# Patient Record
Sex: MALE
[De-identification: names, ages, dates, MRNs, and addresses within clinical notes are randomized per-mention and may not be internally consistent; named-entity substitution may affect disease eponyms.]

---

## 2018-04-26 ENCOUNTER — HOSPITAL ENCOUNTER (OUTPATIENT)
Dept: HOSPITAL 5 - OR | Age: 63
Discharge: HOME | End: 2018-04-26
Attending: UROLOGY
Payer: COMMERCIAL

## 2018-04-26 VITALS — SYSTOLIC BLOOD PRESSURE: 120 MMHG | DIASTOLIC BLOOD PRESSURE: 80 MMHG

## 2018-04-26 DIAGNOSIS — I10: ICD-10-CM

## 2018-04-26 DIAGNOSIS — N13.2: Primary | ICD-10-CM

## 2018-04-26 PROCEDURE — 50590 FRAGMENTING OF KIDNEY STONE: CPT

## 2018-04-26 NOTE — POST OPERATIVE NOTE
Date of procedure: 04/26/18


Pre-op diagnosis: bilat stones   r upj stone 


Post-op diagnosis: same


Findings: 





as above 


Procedure: 





r eswl


Anesthesia: GETA


Surgeon: EUSEBIO VIVEROS


Estimated blood loss: none


Pathology: none


Condition: stable


Disposition: PACU

## 2018-04-26 NOTE — ANESTHESIA CONSULTATION
Anesthesia Consult and Med Hx


Date of service: 04/26/18





- Airway


Anesthetic Teeth Evaluation: Good


ROM Head & Neck: Adequate


Mental/Hyoid Distance: Adequate


Mallampati Class: Class I


Intubation Access Assessment: Good





- Pulmonary Exam


CTA: Yes





- Cardiac Exam


Cardiac Exam: RRR





- Pre-Operative Health Status


ASA Pre-Surgery Classification: ASA2


Proposed Anesthetic Plan: General





- Pulmonary


Hx Smoking: No


Hx Sleep Apnea: No (VENKATA PRE SCREEN HIGH RISK)





- Cardiovascular System


Hx Hypertension: Yes (X 2 YRS)


Hx Heart Attack/AMI: No





- Central Nervous System


Hx Back Pain: Yes (DUE TO STONE)





- Endocrine


Hx Cirrhosis: No





- Other Systems


Hx Cancer: No

## 2018-04-26 NOTE — OPERATIVE REPORT
PREOPERATIVE DIAGNOSIS:  Bilateral renal stones, right UPJ stone and large left

renal stone.



POSTOPERATIVE DIAGNOSES:  Bilateral renal stones, right UPJ stone and large left

renal stone.



PROCEDURE:  Right in situ lithotripsy.



SURGEON:  Connor Granger MD



ANESTHESIA:  General.



FINDINGS:  This is a gentleman with bilateral stones.  He has pain more on the

right than the left.  I spoke with Dr. Norris.  He wants the UPJ stone treated

on the right and he will have followup treatment for the large stone in the

left.



PROCEDURE:  The patient brought to the operating room and placed on the

operating table.  Following induction of anesthesia, placed over the focal point

without difficulty.  The stone was well visualized in the right UPJ.  Shocks

were begun at 1 kV, increased to maximum of 5 kV.  A renal pause was carried out

because it was hitting the lower pole of the kidney.  The patient tolerated the

procedure well.  A total of 2500 shocks were given.  He was brought to recovery

room in stable condition.  The patient did not want a stent and it looked like

there was good fragmentation.





DD: 04/26/2018 08:24

DT: 04/26/2018 09:58

JOB# [id number]  [id number]

KINGSLEY/BRIGIDO

## 2018-04-26 NOTE — DISCHARGE SUMMARY
Short Stay Discharge Plan


Activity: other (no straining )


Weight Bearing Status: Full Weight Bearing


Diet: low fat, low salt


Special Instructions: other (inc fluids )


Follow up with: 


PRIMARY CARE,MD [Primary Care Provider] - 7 Days


ANKITA MELTON MD [Staff Physician] - 7 Days


Forms:  [facility]

## 2018-05-24 ENCOUNTER — HOSPITAL ENCOUNTER (OUTPATIENT)
Dept: HOSPITAL 5 - OR | Age: 63
Discharge: HOME | End: 2018-05-24
Attending: UROLOGY
Payer: COMMERCIAL

## 2018-05-24 VITALS — DIASTOLIC BLOOD PRESSURE: 78 MMHG | SYSTOLIC BLOOD PRESSURE: 127 MMHG

## 2018-05-24 DIAGNOSIS — I10: ICD-10-CM

## 2018-05-24 DIAGNOSIS — N20.0: Primary | ICD-10-CM

## 2018-05-24 DIAGNOSIS — Z88.5: ICD-10-CM

## 2018-05-24 PROCEDURE — 50590 FRAGMENTING OF KIDNEY STONE: CPT

## 2018-05-24 NOTE — DISCHARGE SUMMARY
Short Stay Discharge Plan


Activity: other (no straining )


Weight Bearing Status: Full Weight Bearing


Diet: low fat


Special Instructions: other (inc fluids )


Follow up with: 


LEONIDES FITZPATRICK MD [Primary Care Provider] - 7 Days


ANKITA MELTON MD [Staff Physician] - 7 Days

## 2018-05-24 NOTE — POST OPERATIVE NOTE
Date of procedure: 05/24/18


Pre-op diagnosis: bilat stones 


Post-op diagnosis: same


Findings: 





l renal stones 


Procedure: 





l eswl 


Anesthesia: SHAILESH


Surgeon: EUSEBIO VIVEROS


Estimated blood loss: none


Pathology: none


Condition: stable


Disposition: PACU

## 2018-05-24 NOTE — ANESTHESIA CONSULTATION
Anesthesia Consult and Med Hx


Date of service: 05/24/18





- Airway


Anesthetic Teeth Evaluation: Good, Caps


ROM Head & Neck: Adequate


Mental/Hyoid Distance: Adequate


Mallampati Class: Class II


Intubation Access Assessment: Probably Good





- Pulmonary Exam


CTA: Yes





- Cardiac Exam


Cardiac Exam: RRR





- Pre-Operative Health Status


ASA Pre-Surgery Classification: ASA2


Proposed Anesthetic Plan: General





- Pulmonary


Hx Smoking: No


Hx Sleep Apnea: No (VENKATA PRE SCREEN HIGH RISK)





- Cardiovascular System


Hx Hypertension: Yes (X 2 YRS)


Hx Heart Attack/AMI: No





- Central Nervous System


Hx Back Pain: Yes (DUE TO STONE)





- Endocrine


Hx Cirrhosis: No





- Other Systems


Hx Cancer: No

## 2018-05-24 NOTE — OPERATIVE REPORT
PREOPERATIVE DIAGNOSIS:  Bilateral renal stone.



POSTOPERATIVE DIAGNOSES:  Bilateral renal stone.



PROCEDURE:  Left lithotripsy.



SURGEON:  Connor Granger MD



ANESTHESIA:  General.



FINDINGS:  This is a gentleman, who has had right renal stone, has had that

cleared, now presents for left lithotripsy.  All risks and implications

discussed.



DESCRIPTION OF PROCEDURE:  The patient brought to the operating room and placed

on the operating table.  Following induction of anesthesia stone well localized,

both the AP and oblique image.  I reviewed the case with Dr. Norris.  He had a

ball valve stone, which was now build up in the lower pole with a caliceal

stone.  We were focusing on that stone.  Shocks were begun at 1 kV and increased

to 6 kV.  We were only at 6 for few 100 shocks.  Renal pause was carried out and

then the power was gradually increased.  The patient tolerated the procedure

well.  No significant complication.  He needs followup.  He may need further

procedures such as ureteroscopy, percutaneous nephrolithotomy.  This was

explained, brought to recovery in stable condition.





DD: 05/24/2018 08:34

DT: 05/24/2018 09:36

JOB# [id number]  [id number]

KINGSLEY/BRIGIDO

## 2018-06-07 ENCOUNTER — HOSPITAL ENCOUNTER (OUTPATIENT)
Dept: HOSPITAL 5 - OR | Age: 63
Discharge: HOME | End: 2018-06-07
Attending: UROLOGY
Payer: COMMERCIAL

## 2018-06-07 VITALS — SYSTOLIC BLOOD PRESSURE: 142 MMHG | DIASTOLIC BLOOD PRESSURE: 90 MMHG

## 2018-06-07 DIAGNOSIS — Z79.899: ICD-10-CM

## 2018-06-07 DIAGNOSIS — N13.2: Primary | ICD-10-CM

## 2018-06-07 DIAGNOSIS — I10: ICD-10-CM

## 2018-06-07 DIAGNOSIS — Z98.890: ICD-10-CM

## 2018-06-07 DIAGNOSIS — Z88.5: ICD-10-CM

## 2018-06-07 PROCEDURE — 50590 FRAGMENTING OF KIDNEY STONE: CPT

## 2018-06-07 PROCEDURE — C1758 CATHETER, URETERAL: HCPCS

## 2018-06-07 PROCEDURE — 52356 CYSTO/URETERO W/LITHOTRIPSY: CPT

## 2018-06-07 PROCEDURE — C1769 GUIDE WIRE: HCPCS

## 2018-06-07 PROCEDURE — C1726 CATH, BAL DIL, NON-VASCULAR: HCPCS

## 2018-06-07 PROCEDURE — C2617 STENT, NON-COR, TEM W/O DEL: HCPCS

## 2018-06-07 PROCEDURE — 74018 RADEX ABDOMEN 1 VIEW: CPT

## 2018-06-07 NOTE — ANESTHESIA CONSULTATION
Anesthesia Consult and Med Hx


Date of service: 06/07/18





- Airway


Anesthetic Teeth Evaluation: Poor


ROM Head & Neck: Adequate


Mental/Hyoid Distance: Adequate


Mallampati Class: Class II


Intubation Access Assessment: Probably Good





- Pulmonary Exam


CTA: Yes





- Cardiac Exam


Cardiac Exam: RRR





- Pre-Operative Health Status


ASA Pre-Surgery Classification: ASA2


Proposed Anesthetic Plan: General





- Pulmonary


Hx Smoking: No


Hx Sleep Apnea: No (VENKATA PRE SCREEN HIGH RISK)





- Cardiovascular System


Hx Hypertension: Yes (X 2 YRS)


Hx Heart Attack/AMI: No





- Central Nervous System


Hx Back Pain: Yes (DUE TO STONE)





- Endocrine


Hx Cirrhosis: No





- Other Systems


Hx Cancer: No

## 2018-06-07 NOTE — SHORT STAY SUMMARY
Short Stay Documentation


Date of service: 06/07/18





- History


H&P: obtained from office





- Allergies and Medications


Current Medications: 


 Allergies





codeine Allergy (Verified 04/16/18 17:24)


 Vomiting





 Home Medications











 Medication  Instructions  Recorded  Confirmed  Last Taken  Type


 


HYDROcodone/APAP 5-325 [[place] 1 each PO Q6HR PRN 04/16/18 06/01/18 05/03/18 

History





5/325]     


 


Lisinopril [Zestril] 10 mg PO DAILY 04/16/18 06/01/18 05/23/18 19:30 History


 


Naproxen Sodium [Aleve] 220 mg PO PRN PRN 04/16/18 06/01/18 05/03/18 History


 


Tamsulosin [Flomax] 0.4 mg PO QDAY 04/16/18 06/01/18 05/03/18 History


 


Nitrofurantoin Monohyd/M-Cryst 100 mg PO BID 05/16/18 06/01/18 05/23/18 19:30 

History





[Macrobid 100 mg Capsule]     














- Brief post op/procedure progress note


Date of procedure: 06/07/18


Pre-op diagnosis: Right ureteral stone L4, alfred renal stone , alfred hydro


Procedure: 





rt uret eswl, rt rpg cyssto, 6x28 stent


Anesthesia: GETA


Findings: 





right L4 stone


Surgeon: ANKITA MELTON


Estimated blood loss: minimal


Condition: stable





- Hospital course


Hospital course: 





or pacu home





- Disposition


Condition at discharge: Good


Disposition: DC-01 TO HOME OR SELFCARE





Short Stay Discharge Plan


Activity: advance as tolerated


Diet: advance as tolerated


Follow up with: 


ANKITA MELTON MD [Staff Physician] - 7 Days

## 2018-06-07 NOTE — XRAY REPORT
KUB: 06/07/18 12:24:00



CLINICAL: Left kidney stones.



COMPARISON: 04/25/13



FINDINGS: Left upper quadrant calcifications overlie the left kidney 

and an increase in size, number and density as the last exam. A single 

1 cm more superior calculus and a cluster of one or more inferior 

calculi.  Probable phleboliths in the pelvis.  A right paraspinous 

calcification at L4 appears to be a jojo calcification. Normal bowel 

gas pattern.  The bones are unremarkable.



IMPRESSION: Left renal calculi increased in size and number compared to 

the last exam.

## 2018-06-17 NOTE — OPERATIVE REPORT
PREOPERATIVE DIAGNOSIS:  Right ureteral L4 stone, right hydronephrosis and

bilateral renal stones.



PROCEDURE:  Right ureteral ESWL, right RPG cystoscopy and right 6 x 20 double-J

stent.



ANESTHESIA:  General.



FINDINGS:  Right L4 stone.



SURGEON:  Robi Norris M.D.



ESTIMATED BLOOD LOSS:  Minimal.



CONDITION:  Stable, OR to PACU, to home.



CLINICAL INDICATIONS:  The patient was counseled on RCBA had antibiotics, SCDs. 

Discussed options.



DESCRIPTION OF PROCEDURE:  The patient was transferred to OR suite in supine

position, anesthesia begun.  The stone was easily visible.  A total shocks were

delivered at a maximum of 6.0 kilovolts.  At the end of the procedure, could not

easily see the stone.  At this point, the patient was placed in the dorsal

lithotomy.  Cystoscopy was set up, 22-Setswana cystoscope passed.  Pancystoscopy,

no tumors or lesions.  Of note, the patient has had significant hydro for a

while and elected to proceed with stent placement to ensure adequate drainage. 

Contrast injected, confirmed our position, but there was hydroureter.  Wire

passed up the right renal pelvis.  A 6 x 20 double-J stent was passed over the

wire under direct and fluoroscopic visualization.  When the wire and string was

removed, nice proximal J, nice distal J, bladder drained.  The patient awakened

and transferred to PACU in good and stable condition.



PLAN:  Staged for future removal of stent.





DD: 06/17/2018 22:20

DT: 06/17/2018 22:50

JOB# [id number]  [id number]

ATS/NTS

## 2019-02-07 ENCOUNTER — HOSPITAL ENCOUNTER (OUTPATIENT)
Dept: HOSPITAL 5 - OR | Age: 64
Discharge: HOME | End: 2019-02-07
Attending: UROLOGY
Payer: COMMERCIAL

## 2019-02-07 VITALS — DIASTOLIC BLOOD PRESSURE: 72 MMHG | SYSTOLIC BLOOD PRESSURE: 136 MMHG

## 2019-02-07 DIAGNOSIS — M19.90: ICD-10-CM

## 2019-02-07 DIAGNOSIS — N20.0: Primary | ICD-10-CM

## 2019-02-07 DIAGNOSIS — Z79.899: ICD-10-CM

## 2019-02-07 DIAGNOSIS — I10: ICD-10-CM

## 2019-02-07 DIAGNOSIS — Z88.5: ICD-10-CM

## 2019-02-07 PROCEDURE — 50590 FRAGMENTING OF KIDNEY STONE: CPT

## 2019-02-07 NOTE — ANESTHESIA CONSULTATION
Anesthesia Consult and Med Hx


Date of service: 02/07/19





- Airway


Anesthetic Teeth Evaluation: Good


ROM Head & Neck: Adequate


Mental/Hyoid Distance: Adequate


Mallampati Class: Class I


Intubation Access Assessment: Good





- Pulmonary Exam


CTA: Yes





- Cardiac Exam


Cardiac Exam: RRR





- Pre-Operative Health Status


ASA Pre-Surgery Classification: ASA2


Proposed Anesthetic Plan: General





- Pulmonary


Hx Smoking: No


Hx Asthma: No


Hx Respiratory Symptoms: No


Hx Sleep Apnea: No (VENKATA PRE SCREEN HIGH RISK)





- Cardiovascular System


Hx Hypertension: Yes (last dose antihypertensives 2/6/19 1900)


Hx Heart Attack/AMI: No


Hx Percutaneous Transluminal Coronary Angioplasty (PTCA): No


Hx Cardia Arrhythmia: No





- Central Nervous System


Hx Seizures: No


CVA: No


Hx Back Pain: Yes (2/2 kidney stone)





- Gastrointestinal


Hx Gastroesophageal Reflux Disease: No





- Endocrine


Hx Renal Disease: No


Hx Liver Disease: No


Hx Insulin Dependent Diabetes: No


Hx Non-Insulin Dependent Diabetes: No


Hx Thyroid Disease: No





- Other Systems


Hx Obesity: No





- Additional Comments


Anesthesia Medical History Comments: No hx anesthetic complications.

## 2019-02-07 NOTE — POST OPERATIVE NOTE
Date of procedure: 02/07/19


Pre-op diagnosis: l renal stones 


Post-op diagnosis: same


Findings: 





as above


Procedure: 





L ESWL 


Anesthesia: SHAILESH


Surgeon: EUSEBIO VIVEROS


Estimated blood loss: none


Pathology: none


Condition: stable


Disposition: PACU

## 2019-02-07 NOTE — DISCHARGE SUMMARY
Short Stay Discharge Plan


Activity: other (no straining )


Weight Bearing Status: Full Weight Bearing


Diet: low fat, low cholesterol, low salt


Special Instructions: other (inc fluids )


Follow up with: 


LEONIDES FITZPATRICK MD [Primary Care Provider] - 7 Days


ANKITA MELTON MD [Staff Physician] - 7 Days

## 2019-02-07 NOTE — OPERATIVE REPORT
PREOPERATIVE DIAGNOSIS:  Left renal stone.



POSTOPERATIVE DIAGNOSIS:  Left renal stone.



PROCEDURE:  Left ESWL.



SURGEON:  Connor Granger MD



ANESTHESIA:  General.



FINDINGS:  This is a gentleman with a large stone, left kidney.  He has an

intermittent pain, he now presents for lithotripsy.



DESCRIPTION OF PROCEDURE:  The patient was brought to the operating room and

placed on the operating table.  Following induction of anesthesia, stone was

easily localized on both the AP and oblique image.  Shocks were begun at 1 kV,

increased to a maximum of 8 kV.  Total of 2500 shocks were given.  The patient

tolerated the procedure well.  Excellent fragmentation was accomplished and the

plan, he needs close followup because this was a large stone.  This was

explained to him and his family.  He tolerated the procedure well and brought to

recovery in stable condition.





DD: 02/07/2019 12:13

DT: 02/07/2019 12:33

JOB# [id number]  [id number]

KINGSLEY/BRIGIDO

## 2019-08-07 ENCOUNTER — HOSPITAL ENCOUNTER (OUTPATIENT)
Dept: HOSPITAL 5 - OR | Age: 64
Discharge: HOME | End: 2019-08-07
Attending: UROLOGY
Payer: COMMERCIAL

## 2019-08-07 VITALS — SYSTOLIC BLOOD PRESSURE: 160 MMHG | DIASTOLIC BLOOD PRESSURE: 84 MMHG

## 2019-08-07 DIAGNOSIS — N20.2: Primary | ICD-10-CM

## 2019-08-07 DIAGNOSIS — I10: ICD-10-CM

## 2019-08-07 DIAGNOSIS — Z88.5: ICD-10-CM

## 2019-08-07 DIAGNOSIS — M19.90: ICD-10-CM

## 2019-08-07 DIAGNOSIS — Z98.890: ICD-10-CM

## 2019-08-07 DIAGNOSIS — Z79.899: ICD-10-CM

## 2019-08-07 PROCEDURE — 74420 UROGRAPHY RTRGR +-KUB: CPT

## 2019-08-07 PROCEDURE — C1758 CATHETER, URETERAL: HCPCS

## 2019-08-07 PROCEDURE — 52356 CYSTO/URETERO W/LITHOTRIPSY: CPT

## 2019-08-07 PROCEDURE — C1769 GUIDE WIRE: HCPCS

## 2019-08-07 PROCEDURE — A4217 STERILE WATER/SALINE, 500 ML: HCPCS

## 2019-08-07 PROCEDURE — C2617 STENT, NON-COR, TEM W/O DEL: HCPCS

## 2019-08-07 NOTE — SHORT STAY SUMMARY
Short Stay Documentation


Date of service: 08/07/19





- History


Past Medical History: No medical history





- Allergies and Medications


Current Medications: 


                                    Allergies





codeine Allergy (Verified 04/16/18 17:24)


   Vomiting





                                Home Medications











 Medication  Instructions  Recorded  Confirmed  Last Taken  Type


 


HYDROcodone/APAP 5-325 [[place] 1 each PO Q6HR PRN 04/16/18 08/07/19 05/03/18 

History





5/325]     


 


Lisinopril [Zestril] 10 mg PO DAILY 04/16/18 08/07/19 08/06/19 20:00 History


 


Naproxen Sodium [Aleve] 220 mg PO PRN PRN 04/16/18 08/07/19 08/05/19 09:00 

History


 


Tamsulosin [Flomax] 0.4 mg PO QDAY 04/16/18 08/07/19 08/06/19 20:00 History








Active Medications





Cefazolin Sodium (Ancef/Sterile Water 2 Gm/20 Ml)  2 gm IV PREOP NR


   Stop: 08/07/19 23:59


Fentanyl (Sublimaze)  50 mcg IV Q5MIN PRN


   PRN Reason: Pain , Severe (7-10)


Lactated Ringer's (Lactated Ringers)  1,000 mls @ 100 mls/hr IV AS DIRECT FREDY


   Last Admin: 08/07/19 15:00 Dose:  100 mls/hr


   Documented by: 


Ondansetron HCl (Zofran)  4 mg IV ONCE PRN


   PRN Reason: Nausea And Vomiting











- Brief post op/procedure progress note


Date of procedure: 08/07/19


Pre-op diagnosis: left hyydro, upj 7m; left lower poles stones


Post-op diagnosis: same


Procedure: 





lt urs, laser, sbe, stent 6x28, staged for removal, alfred rpb


Anesthesia: GETA


Findings: 





left mid joey, stone likely rolled back since pain betterl; left lp stone


Surgeon: ANKITA MELTON


Estimated blood loss: minimal


Pathology: none


Condition: stable





- Hospital course


Hospital course: 





[facility]





- Disposition


Condition at discharge: Good


Disposition: DC-01 TO HOME OR SELFCARE





Short Stay Discharge Plan


Activity: advance as tolerated


Diet: advance as tolerated


Follow up with: 


ANKITA MELTON MD [Staff Physician] - 7 Days

## 2019-08-07 NOTE — ANESTHESIA CONSULTATION
Anesthesia Consult and Med Hx


Date of service: 08/07/19





- Airway


Anesthetic Teeth Evaluation: Chipped, Caps


ROM Head & Neck: Adequate


Mental/Hyoid Distance: Adequate


Mallampati Class: Class II


Intubation Access Assessment: Good





- Pre-Operative Health Status


ASA Pre-Surgery Classification: ASA2, Emergency


Proposed Anesthetic Plan: General





- Pulmonary


Hx Smoking: No


Hx Asthma: No


Hx Respiratory Symptoms: No


Hx Sleep Apnea: No (VENKATA PRE SCREEN HIGH RISK)





- Cardiovascular System


Hx Hypertension: Yes (last dose antihypertensives yesterday PM)


Hx Heart Attack/AMI: No


Hx Percutaneous Transluminal Coronary Angioplasty (PTCA): No


Hx Cardia Arrhythmia: No





- Central Nervous System


Hx Seizures: No


CVA: No


Hx Back Pain: Yes (2/2 kidney stone)





- Gastrointestinal


Hx Gastroesophageal Reflux Disease: No





- Endocrine


Hx Renal Disease: Yes (Stones)


Hx Cirrhosis: No


Hx Liver Disease: No


Hx Insulin Dependent Diabetes: No


Hx Non-Insulin Dependent Diabetes: No


Hx Thyroid Disease: No





- Other Systems


Hx Cancer: No


Hx Obesity: No

## 2019-08-07 NOTE — FLUOROSCOPY REPORT
8 fluoroscopic images submitted



Indication:  Intraoperative localization



Impression:  8 images of abdomen were submitted for documentation purposes with radiology involvement
.  Bilateral cystoscopy performed with stones visualized in the left kidney, stone removal, and ultim
ately left-sided double-J ureteral stent placement in satisfactory position.



Fluoroscopic time:  3.3 minutes



Signer Name: Zay Foy MD 

Signed: 8/7/2019 7:05 PM

 Workstation Name: [id number]

## 2019-08-22 NOTE — OPERATIVE REPORT
PREOPERATIVE DIAGNOSES:  Left proximal ureteral stone, left renal stones.



POSTOPERATIVE DIAGNOSES:  Left proximal ureteral stone, left renal stones.



PROCEDURES:  Cystoscopy, bilateral RPG, left ureteroscopy, holmium laser

fragmentation of the UPJ stone, holmium laser fragmentation of lower pole

stones, left stent placement, staged for future removal.



SURGEON:  Robi Norris MD.



ANESTHESIA:  General.



SPECIMEN:  None.



ESTIMATED BLOOD LOSS:  Minimal.



FINDINGS:  Stone in left renal pelvis, stone in the lower pole difficult to

access and of an anatomy that may be difficult to passively move to the UPJ.



CLINICAL INDICATIONS:  Counseled RCBA, antibiotics, SCD.



DESCRIPTION OF PROCEDURE:  Transferred to OR suite in supine position,

anesthesia, dorsal lithotomy, prepped and draped in standard fashion.  A

22-Pakistani scope passed.  Pancystoscopy, no tumors.  Right retrograde pyelogram,

8-Pakistani cone tipped catheter within normal limits.  There was radiopaque

density before contrast was injected, probably left renal pelvis and left lower

pole and probably left mid jose f, could be a stone.  At this point, dye was

injected.  No filling defects.  Second Glidewire passed.  Flexible scope was

passed over the wire up to the renal pelvis.  No stone at the UPJ was just

inside the pelvis and then pushed back to the mid portion of the kidney. 

Holmium laser fiber passed.  This was fragmented into smaller and smaller pieces

with continued irrigation, evaluation and inspection, removing the scope, which

was difficult due to its location.  Next, when this was in tiny fragments, we

passed the scope into the lower pole, which was difficult not a great angle. 

There was a narrowing in the opening to the calyx and was in a position, which

could be hard to pass, but we were able to get laser fiber down to the stone. 

We fragmented this into multiple tiny fragments, but unable to grasp.  The scope

was withdrawn.  Wire backloaded on the cystoscope.  A 6-Pakistani double-J stent

was passed over the wire under direct and fluoroscopic visualization.  When wire

and string removed, nice proximal and distal J, staged for future removal.  Exam

under anesthesia, bilateral descended testicles, prostate, no nodules.  The

patient awakened and transferred to PACU in good and stable condition.





DD: 08/22/2019 12:59

DT: 08/22/2019 13:21

JOB# [id number]  [id number]

ATS/NTS